# Patient Record
Sex: MALE | Race: WHITE
[De-identification: names, ages, dates, MRNs, and addresses within clinical notes are randomized per-mention and may not be internally consistent; named-entity substitution may affect disease eponyms.]

---

## 2019-12-02 ENCOUNTER — HOSPITAL ENCOUNTER (EMERGENCY)
Dept: HOSPITAL 26 - MED | Age: 64
LOS: 1 days | Discharge: HOME | End: 2019-12-03
Payer: COMMERCIAL

## 2019-12-02 VITALS — DIASTOLIC BLOOD PRESSURE: 49 MMHG | SYSTOLIC BLOOD PRESSURE: 93 MMHG

## 2019-12-02 VITALS — HEIGHT: 65 IN | BODY MASS INDEX: 29.16 KG/M2 | WEIGHT: 175 LBS

## 2019-12-02 DIAGNOSIS — Z98.890: ICD-10-CM

## 2019-12-02 DIAGNOSIS — Z48.01: Primary | ICD-10-CM

## 2019-12-02 NOTE — NUR
63 Y/O MALE BIB [facility]. PRESENTS TO ED WITH LEFT ANKLE WOUND. PT IS AT ED 
FOR WOUND EVAL. PT STATES MILD PAIN ON LEFT ANKLE 5/10. NO DRAINAGE OR BLEEDING 
NOTED. NO OBVIOUS SIGNS OF INFECTION. PT AMBULATES WITH WHEELCHAIR. PT STABLE. 
WILL CONTINUE TO MONITOR.

## 2019-12-03 VITALS — DIASTOLIC BLOOD PRESSURE: 51 MMHG | SYSTOLIC BLOOD PRESSURE: 101 MMHG

## 2019-12-03 NOTE — NUR
PT DISCHARGED WITH PAPERWORK. NO RX PROVIDED. EDUCATED PT REGARDING D/C 
DIAGNOSIS. PT VERBALIZED UNDERSTANDING OF TEACHING. TOLD PT WHEN TO FOLLOW UP 
WITH PCP AND WHEN TO RETURN TO ED. PT STABLE. ALL QUESTIONS ANSWERED.